# Patient Record
(demographics unavailable — no encounter records)

---

## 2024-11-06 NOTE — REVIEW OF SYSTEMS
[Postnasal Drip] : postnasal drip [SOB on Exertion] : sob on exertion [Fever] : no fever [Recent Wt Loss (___ Lbs)] : ~T no recent weight loss [Dry Eyes] : no dry eyes [Mouth Ulcers] : no mouth ulcers [Cough] : no cough [Wheezing] : no wheezing [Chest Discomfort] : no chest discomfort [GERD] : no gerd

## 2024-11-06 NOTE — PHYSICAL EXAM
[No Acute Distress] : no acute distress [Normal Oropharynx] : normal oropharynx [I] : Mallampati Class: I [Normal Appearance] : normal appearance [No Neck Mass] : no neck mass [Normal Rate/Rhythm] : normal rate/rhythm [Normal S1, S2] : normal s1, s2 [No Murmurs] : no murmurs [No Resp Distress] : no resp distress [Wheeze] : wheeze [Kyphosis] : kyphosis [Not Tender] : not tender [Soft] : soft [No HSM] : no hsm [Normal Bowel Sounds] : normal bowel sounds [No Clubbing] : no clubbing [No Edema] : no edema [No Focal Deficits] : no focal deficits [Oriented x3] : oriented x3 [Normal Affect] : normal affect [TextBox_68] : moderate diffuse  rhonchi and  squeaks, crackles

## 2024-11-06 NOTE — HISTORY OF PRESENT ILLNESS
[Never] : never [TextBox_4] : 82 year old woman returns for follow up. She has bronchiectasis and interstitial lung disease. It has not progressed.   PFT has demonstrated moderate restriction. CT chest was performed February 2019 and demonstrated interstitial infiltrates and bronchial inflammation. Possibility of mild IPF was raised. However, bronchiectasis and mosaic pattern suggest possible chronic hypersensitivity pneumonitis.   She was born in [place]. [place] has [place] in [place] age 17. She has had no animal exposure, no pets. She has no dampness or mold in her apartment. She has no arthritis, rash. She has no exposure to dirty conditions, dirty air conditioner. No exposure to humidifier or to down pillows.    She initially presented and was hospitalized at [facility] in December 2018 and was found to have RSV infection.  CT chest at the time revealed interstitial infiltrates and bronchial inflammation. She had significant cough and dyspnea that improved.  She required subsequent treatment with oral antibiotic and steroid taper due to cough and chest congestion.   She had normal EST.      She denies dyspnea and  states that she walks without limitation  PFT has shown moderate restriction   She has residual cough with white sputum.  She did not have fever.  She was wheezing.   She denies dyspnea.

## 2024-11-06 NOTE — DISCUSSION/SUMMARY
[FreeTextEntry1] : 82 year old woman with bronchiectasis/ILD   She initially presented and was hospitalized at [facility] in December 2018 and was found to have RSV infection.  CT chest at the time revealed interstitial infiltrates and bronchial inflammation. She had significant cough and dyspnea that improved.  She required subsequent treatment with oral antibiotic and steroid taper due to cough and chest congestion.  She has had episodes , likely  exacerbation of bronchiectasis She has been treated azithromycin with benefit  Breath sounds demonstrated prominent squeaks and rhonchi  She had NC CT chest 11/8/22  that demonstrated interstitial lung disease with tree in bud and air trapping monitor sputum production CT chest at  [facility] 04/20/2024 demonstrated interstitial fibrosis and traction bronchiectasis reportedly similar to CT of 2022 (but worse compared to CT from  [facility] from 2019 and 2021)  There is also mosaic pattern consistent with air trapping   She emphasizes that she feels well  PFT with restriction  Not using  inhaled bronchodilator   I am concerned about  worsening lung scarring   PLAN monitor O2 saturation  check CTD serologies and hypersensitivity panel to evaluate for chronic  hypersensitivity pneumonitis  Obtain CD of images from  [facility] to have radiologist review in comparison to current CT  I will consider starting an antifibrotic for PFF Will repeat CT chest for follow up   as needed    Total time spent : 40 minutes Including: Preparation prior to visit - Reviewing prior record, results of tests and Consultation Reports as applicable Conducting an appropriate H & P during today's encounter Appropriate orders for tests, medications and procedures, as applicable Counseling patient  Note completion   Issac Vicente MD

## 2025-01-04 NOTE — DISCUSSION/SUMMARY
[FreeTextEntry1] : 83 year old woman with bronchiectasis/ILD   She initially presented and was hospitalized at [facility] in December 2018 and was found to have RSV infection.  CT chest at the time revealed interstitial infiltrates and bronchial inflammation. She had significant cough and dyspnea that improved.  She required subsequent treatment with oral antibiotic and steroid taper due to cough and chest congestion.  She has had episodes , likely  exacerbation of bronchiectasis She has been treated azithromycin with benefit  Breath sounds demonstrated prominent squeaks and rhonchi  She had NC CT chest 11/8/22  that demonstrated interstitial lung disease with tree in bud and air trapping monitor sputum production CT chest at  [facility] 04/20/2024 demonstrated interstitial fibrosis and traction bronchiectasis reportedly similar to CT of 2022 (but worse compared to CT from  [facility] from 2019 and 2021)  There is also mosaic pattern consistent with air trapping   She emphasizes that she feels well  PFT with restriction  Not using  inhaled bronchodilator   I am concerned about  worsening lung scarring   PLAN monitor O2 saturation  check CTD serologies and hypersensitivity panel to evaluate for chronic  hypersensitivity pneumonitis  Obtain CD of images from  [facility] to have radiologist review in comparison to current CT  I will consider starting an antifibrotic for PFF Will repeat CT chest for follow up   as needed    Total time spent : 40 minutes Including: Preparation prior to visit - Reviewing prior record, results of tests and Consultation Reports as applicable Conducting an appropriate H & P during today's encounter Appropriate orders for tests, medications and procedures, as applicable Counseling patient  Note completion   Issac Vicente MD

## 2025-01-04 NOTE — HISTORY OF PRESENT ILLNESS
[Never] : never [TextBox_4] : 82 year old woman returns for follow up. She has bronchiectasis and interstitial lung disease. It has not progressed.   PFT has demonstrated moderate restriction. CT chest was performed February 2019 and demonstrated interstitial infiltrates and bronchial inflammation. Possibility of mild IPF was raised. However, bronchiectasis and mosaic pattern suggest possible chronic hypersensitivity pneumonitis.   She was born in [place]. [place] has [place] in [place] age 17. She has had no animal exposure, no pets. She has no dampness or mold in her apartment. She has no arthritis, rash. She has no exposure to dirty conditions, dirty air conditioner. No exposure to humidifier or to down pillows.    She initially presented and was hospitalized at [facility] in December 2018 and was found to have RSV infection.  CT chest at the time revealed interstitial infiltrates and bronchial inflammation. She had significant cough and dyspnea that improved.  She required subsequent treatment with oral antibiotic and steroid taper due to cough and chest congestion.   She had normal EST.      She denies dyspnea and  states that she walks without limitation  PFT has shown moderate restriction   She has residual cough with white sputum.  She did not have fever.  She was wheezing.   She denies dyspnea.

## 2025-01-04 NOTE — REVIEW OF SYSTEMS
[Fever] : no fever [Recent Wt Loss (___ Lbs)] : ~T no recent weight loss [Dry Eyes] : no dry eyes [Postnasal Drip] : postnasal drip [Mouth Ulcers] : no mouth ulcers [Cough] : no cough [Wheezing] : no wheezing [SOB on Exertion] : sob on exertion [Chest Discomfort] : no chest discomfort [GERD] : no gerd

## 2025-01-15 NOTE — REVIEW OF SYSTEMS
[Fever] : no fever [Recent Wt Loss (___ Lbs)] : ~T no recent weight loss [Dry Eyes] : no dry eyes [Mouth Ulcers] : no mouth ulcers [Cough] : no cough [Wheezing] : no wheezing [Chest Discomfort] : no chest discomfort [GERD] : no gerd

## 2025-01-15 NOTE — HISTORY OF PRESENT ILLNESS
[TextBox_4] : 82 year old woman returns for follow up. She has bronchiectasis and interstitial lung disease. It has not progressed.   PFT has demonstrated moderate restriction. CT chest was performed February 2019 and demonstrated interstitial infiltrates and bronchial inflammation. Possibility of mild IPF was raised. However, bronchiectasis and mosaic pattern suggest possible chronic hypersensitivity pneumonitis.   She was born in [place]. [place] has lived in [place] age 17. She has had no animal exposure, no pets. She has no dampness or mold in her apartment. She has no arthritis, rash. She has no exposure to dirty conditions, dirty air conditioner. No exposure to humidifier or to down pillows.    She initially presented and was hospitalized at [facility] in December 2018 and was found to have RSV infection.  CT chest at the time revealed interstitial infiltrates and bronchial inflammation. She had significant cough and dyspnea that improved.  She required subsequent treatment with oral antibiotic and steroid taper due to cough and chest congestion.   She had normal EST.      She denies dyspnea and  states that she walks without limitation  PFT has shown moderate restriction   She has residual cough with white sputum.  She did not have fever.  She was wheezing.   She denies dyspnea.

## 2025-01-15 NOTE — PROCEDURE
[FreeTextEntry1] : Spirometry consistent with restriction diminished   DLCO  stable  Issac Vicente MD

## 2025-01-15 NOTE — HISTORY OF PRESENT ILLNESS
[TextBox_4] : 82 year old woman returns for follow up. She has bronchiectasis and interstitial lung disease. It has not progressed.   PFT has demonstrated moderate restriction. CT chest was performed February 2019 and demonstrated interstitial infiltrates and bronchial inflammation. Possibility of mild IPF was raised. However, bronchiectasis and mosaic pattern suggest possible chronic hypersensitivity pneumonitis.   She was born in [place]. She has lived [place] age 17. She has had no animal exposure, no pets. She has no dampness or mold in her apartment. She has no arthritis, rash. She has no exposure to dirty conditions, dirty air conditioner. No exposure to humidifier or to down pillows.    She initially presented and was hospitalized at [facility] in December 2018 and was found to have RSV infection.  CT chest at the time revealed interstitial infiltrates and bronchial inflammation. She had significant cough and dyspnea that improved.  She required subsequent treatment with oral antibiotic and steroid taper due to cough and chest congestion.   She had normal EST.      She denies dyspnea and  states that she walks without limitation  PFT has shown moderate restriction   She has residual cough with white sputum.  She did not have fever.  She was wheezing.   She denies dyspnea.

## 2025-01-15 NOTE — DISCUSSION/SUMMARY
[FreeTextEntry1] : 83 year old woman with bronchiectasis/ILD   She initially presented and was hospitalized at [facility] in December 2018 and was found to have RSV infection.  CT chest at the time revealed interstitial infiltrates and bronchial inflammation. She had significant cough and dyspnea that improved.  She required subsequent treatment with oral antibiotic and steroid taper due to cough and chest congestion.  She has had episodes , likely  exacerbation of bronchiectasis She has been treated azithromycin with benefit  Breath sounds demonstrated prominent squeaks and rhonchi  She had NC CT chest 11/8/22  that demonstrated interstitial lung disease with tree in bud and air trapping monitor sputum production CT chest at  [facility] 04/20/2024 demonstrated interstitial fibrosis and traction bronchiectasis reportedly similar to CT of 2022 (but worse compared to CT from  [facility] from 2019 and 2021)  There is also mosaic pattern consistent with air trapping   She emphasizes that she feels well  PFT with restriction  Not using  inhaled bronchodilator   I am concerned about  worsening lung scarring   checked CTD serologies and hypersensitivity panel to evaluate for chronic  hypersensitivity pneumonitis ANCA indeterminate  ds DNA and JOSH positive  PLAN  repeat NC HRCT Chest  discussed antifibrotic, provided with literature  will review all CT chest and decide whether antifibrotic indicated for possible PFILD  monitor O2 saturation  Total time spent : 30 minutes Including: Preparation prior to visit - Reviewing prior record, results of tests and Consultation Reports as applicable Conducting an appropriate H & P during today's encounter Appropriate orders for tests, medications and procedures, as applicable Counseling patient  Note completion   Issac Vicente MD

## 2025-01-15 NOTE — DISCUSSION/SUMMARY
[FreeTextEntry1] : 83 year old woman with bronchiectasis/ILD   She initially presented and was hospitalized at [facility] in December 2018 and was found to have RSV infection.  CT chest at the time revealed interstitial infiltrates and bronchial inflammation. She had significant cough and dyspnea that improved.  She required subsequent treatment with oral antibiotic and steroid taper due to cough and chest congestion.  She has had episodes , likely  exacerbation of bronchiectasis She has been treated azithromycin with benefit  Breath sounds demonstrated prominent squeaks and rhonchi  She had NC CT chest 11/8/22  that demonstrated interstitial lung disease with tree in bud and air trapping monitor sputum production CT chest at  [facility] 04/20/2024 demonstrated interstitial fibrosis and traction bronchiectasis reportedly similar to CT of 2022 (but worse compared to CT from  [facility] from 2019 and 2021)  There is also mosaic pattern consistent with air trapping   She emphasizes that she feels well  PFT with restriction  Not using  inhaled bronchodilator   I am concerned about  worsening lung scarring   checked CTD serologies and hypersensitivity panel to evaluate for chronic  hypersensitivity pneumonitis ANCA indeterminate  ds DNA and JOSH positive  PLAN  repeat NC HRCT Chest  discussed antifibrotic, provided with literature  will review all CT chest and decide whether antifibrotic indicated for possible PFILD  monitor O2 saturation  Total time spent : 30 minutes Including: Preparation prior to visit - Reviewing prior record, results of tests and Consultation Reports as applicable Conducting an appropriate H & P during today's encounter Appropriate orders for tests, medications and procedures, as applicable Counseling patient  Note completion   Issac Vicente MD

## 2025-04-29 NOTE — DISCUSSION/SUMMARY
[FreeTextEntry1] : 83 year old woman with bronchiectasis/ILD   She initially presented and was hospitalized at [facility] in December 2018 and was found to have RSV infection.  CT chest at the time revealed interstitial infiltrates and bronchial inflammation. She had significant cough and dyspnea that improved.  She required subsequent treatment with oral antibiotic and steroid taper due to cough and chest congestion.  She has had episodes , likely  exacerbation of bronchiectasis She has been treated azithromycin with benefit  Breath sounds demonstrated prominent squeaks and rhonchi  She had NC CT chest 11/8/22  that demonstrated interstitial lung disease with tree in bud and air trapping monitor sputum production CT chest at  [facility] 04/20/2024 demonstrated interstitial fibrosis and traction bronchiectasis reportedly similar to CT of 2022 (but worse compared to CT from  [facility] from 2019 and 2021)  There is also mosaic pattern consistent with air trapping   She emphasizes that she feels well  PFT with restriction  Not using  inhaled bronchodilator   I am concerned about  worsening lung scarring   checked CTD serologies and hypersensitivity panel to evaluate for chronic  hypersensitivity pneumonitis ANCA indeterminate  ds DNA and JOSH positive  repeat NC HRCT Chest   2/11/25 demonstrated mild  worsening consistent with 2021 Using mullein  PLAN  discussed antifibrotic, provided with literature.  She discussed with her son and so far has declined this  will monitor respiratory status  Discuss further with her son monitor O2 saturation  Total time spent : 30 minutes Including: Preparation prior to visit - Reviewing prior record, results of tests and Consultation Reports as applicable Conducting an appropriate H & P during today's encounter Appropriate orders for tests, medications and procedures, as applicable Counseling patient  Note completion   Issac Vicente MD

## 2025-04-29 NOTE — PROCEDURE
[FreeTextEntry1] : PRO CROSS Date of Birth 1941-11-18 Procedure CT CHEST w/o contrast Study Date & Time 2025-01-22 1:58 PM Patient ID [id number] Accession Number [id number] Referring Physician IKER BROWNE Institution Name [id number] RADIOLOGY REPORT FINDINGS FINDING ADDENDUM This addendum is dictated on 2/11/2025 in order to compare the 1/22/2025 examination with an additional remote previous outside chest CT from 1/20/2025 which has now been made available. The changes of moderate interstitial lung disease which are described on the current examination, with associated air trapping, have demonstrated mild diffuse interval worsening relative to the previous examination from 2021. Electronically signed by: Jose Angel Coleman MD 2/11/2025 7:17 PM Workstation: [id number] Addendum Electronically Signed By: Jose Angel Coleman MD Sign Date: 11-FEB-25 Reason for examination : Interstitial lung disease, follow-up Comparison: 4/20/2024 Technique: 3 mm spiral sections were obtained through the thorax both during inspiration and expiration.. This CT examination was performed using one or more of the following dose reduction techniques: Automated exposure control, adjustment of the mA and/or kV according to patient size, or use of a iterative reconstruction technique. Findings: HILUM AND MEDIASTINUM : No significant adenopathy or masses are present. THORACIC AORTA: No evidence of aneurysm. LUNG PARENCHYMA : There is a moderate degree of diffuse interstitial lung disease again identified, manifested by areas of reticulation, mosaic attenuation with air trapping, moderate traction bronchiectasis, and areas of groundglass opacity.. The findings have overall worsened mildly. In 4/29/25, 11:53 AM [employer] [email or web address] 1/2 addition, there is worsening superimposed consolidative opacity now identified in the left upper lobe, predominantly involving the anterior segment, which may represent superimposed infection versus worsening pneumonitis. There is no evidence of posterior subpleural sparing. There is no evidence of associated honeycombing. There are probable associated areas of small airways inflammatory disease also noted bilaterally. PLEURA : No evidence of pleural effusion or pleural-based mass. LOWER NECK/THYROID : Without focal nodules or masses. OSSEOUS STRUCTURES : Without focal mass. UPPER ABDOMEN : Small hiatal hernia. OTHER : Negative. IMPRESSION: Moderate degree of interstitial lung disease with associated air trapping, slightly worsened. These findings are most suggestive of chronic hypersensitivity pneumonitis. Additional less likely possibilities would include nonspecific interstitial pneumonia. Associated worsening predominantly consolidative airspace opacity in the left upper lobe which may be due to superimposed infection versus worsening left upper lobe pneumonitis. Electronically signed by: Jose Angel Coleman MD 1/22/2025 2:13 PM Workstation: [id number] Electronically Signed By: Jared URIOSTEGUI, Joes Angel BARAHONA Sign Date: 22-JAN-25 [facility] MD fco

## 2025-04-29 NOTE — HISTORY OF PRESENT ILLNESS
[TextBox_4] : 83 year old woman returns for follow up. She has bronchiectasis and interstitial lung disease. It has not progressed.   PFT has demonstrated moderate restriction. CT chest was performed February 2019 and demonstrated interstitial infiltrates and bronchial inflammation. Possibility of mild IPF was raised. However, bronchiectasis and mosaic pattern suggest possible chronic hypersensitivity pneumonitis.   She was born in [place]. [place] has [place] 17. She has had no animal exposure, no pets. She has no dampness or mold in her apartment. She has no arthritis, rash. She has no exposure to dirty conditions, dirty air conditioner. No exposure to humidifier or to down pillows.    She initially presented and was hospitalized at [facility] in December 2018 and was found to have RSV infection.  CT chest at the time revealed interstitial infiltrates and bronchial inflammation. She had significant cough and dyspnea that improved.  She required subsequent treatment with oral antibiotic and steroid taper due to cough and chest congestion.   She had normal EST.      She denies dyspnea and  states that she walks without limitation  PFT has shown moderate restriction   She has residual cough with white sputum.  She did not have fever.  She was wheezing.   She denies dyspnea.

## 2025-04-29 NOTE — PROCEDURE
[FreeTextEntry1] : PRO CROSS Date of Birth 1941-11-[address] CHEST w/o contrast Study Date & Time 2025-01-22 1:58 PM Patient ID [id number] Accession Number [id number] Referring Physician IKER BROWNE Institution Name [email or web address] RADIOLOGY REPORT FINDINGS FINDING ADDENDUM This addendum is dictated on 2/11/2025 in order to compare the 1/22/2025 examination with an additional remote previous outside chest CT from 1/20/2025 which has now been made available. The changes of moderate interstitial lung disease which are described on the current examination, with associated air trapping, have demonstrated mild diffuse interval worsening relative to the previous examination from 2021. Electronically signed by: Jose Angel Coleman MD 2/11/2025 7:17 PM Workstation: [id number] Addendum Electronically Signed By: Jose Angel Coleman MD Sign Date: 11-FEB-25 Reason for examination : Interstitial lung disease, follow-up Comparison: 4/20/2024 Technique: 3 mm spiral sections were obtained through the thorax both during inspiration and expiration.. This CT examination was performed using one or more of the following dose reduction techniques: Automated exposure control, adjustment of the mA and/or kV according to patient size, or use of a iterative reconstruction technique. Findings: HILUM AND MEDIASTINUM : No significant adenopathy or masses are present. THORACIC AORTA: No evidence of aneurysm. LUNG PARENCHYMA : There is a moderate degree of diffuse interstitial lung disease again identified, manifested by areas of reticulation, mosaic attenuation with air trapping, moderate traction bronchiectasis, and areas of groundglass opacity.. The findings have overall worsened mildly. In 4/29/25, 11:53 AM Syn[employer] [email or web address] 1/2 addition, there is worsening superimposed consolidative opacity now identified in the left upper lobe, predominantly involving the anterior segment, which may represent superimposed infection versus worsening pneumonitis. There is no evidence of posterior subpleural sparing. There is no evidence of associated honeycombing. There are probable associated areas of small airways inflammatory disease also noted bilaterally. PLEURA : No evidence of pleural effusion or pleural-based mass. LOWER NECK/THYROID : Without focal nodules or masses. OSSEOUS STRUCTURES : Without focal mass. UPPER ABDOMEN : Small hiatal hernia. OTHER : Negative. IMPRESSION: Moderate degree of interstitial lung disease with associated air trapping, slightly worsened. These findings are most suggestive of chronic hypersensitivity pneumonitis. Additional less likely possibilities would include nonspecific interstitial pneumonia. Associated worsening predominantly consolidative airspace opacity in the left upper lobe which may be due to superimposed infection versus worsening left upper lobe pneumonitis. Electronically signed by: Jose Angel Coleman MD 1/22/2025 2:13 PM Workstation: [id number] Electronically Signed By: Jared URIOSTEGUI, Jose Angel BARAHONA Sign Date: 22-JAN-25 [facility] Radiology MD fco

## 2025-04-29 NOTE — HISTORY OF PRESENT ILLNESS
[TextBox_4] : 83 year old woman returns for follow up. She has bronchiectasis and interstitial lung disease. It has not progressed.   PFT has demonstrated moderate restriction. CT chest was performed February 2019 and demonstrated interstitial infiltrates and bronchial inflammation. Possibility of mild IPF was raised. However, bronchiectasis and mosaic pattern suggest possible chronic hypersensitivity pneumonitis.   She was born in [place]. She has lived in [place] age 17. She has had no animal exposure, no pets. She has no dampness or mold in her apartment. She has no arthritis, rash. She has no exposure to dirty conditions, dirty air conditioner. No exposure to humidifier or to down pillows.    She initially presented and was hospitalized at [facility] in December 2018 and was found to have RSV infection.  CT chest at the time revealed interstitial infiltrates and bronchial inflammation. She had significant cough and dyspnea that improved.  She required subsequent treatment with oral antibiotic and steroid taper due to cough and chest congestion.   She had normal EST.      She denies dyspnea and  states that she walks without limitation  PFT has shown moderate restriction   She has residual cough with white sputum.  She did not have fever.  She was wheezing.   She denies dyspnea.

## 2025-04-29 NOTE — HISTORY OF PRESENT ILLNESS
[TextBox_4] : 83 year old woman returns for follow up. She has bronchiectasis and interstitial lung disease. It has not progressed.   PFT has demonstrated moderate restriction. CT chest was performed February 2019 and demonstrated interstitial infiltrates and bronchial inflammation. Possibility of mild IPF was raised. However, bronchiectasis and mosaic pattern suggest possible chronic hypersensitivity pneumonitis.   She was born in [place]. [place] has lived in [place] age 17. She has had no animal exposure, no pets. She has no dampness or mold in her apartment. She has no arthritis, rash. She has no exposure to dirty conditions, dirty air conditioner. No exposure to humidifier or to down pillows.    She initially presented and was hospitalized at [facility] in December 2018 and was found to have RSV infection.  CT chest at the time revealed interstitial infiltrates and bronchial inflammation. She had significant cough and dyspnea that improved.  She required subsequent treatment with oral antibiotic and steroid taper due to cough and chest congestion.   She had normal EST.      She denies dyspnea and  states that she walks without limitation  PFT has shown moderate restriction   She has residual cough with white sputum.  She did not have fever.  She was wheezing.   She denies dyspnea.

## 2025-04-29 NOTE — PROCEDURE
[FreeTextEntry1] : PRO CROSS Date of Birth 1941-11-18 Procedure CT CHEST w/o contrast Study Date & Time 2025-01-22 1:58 PM Patient ID [id number] Accession Number [id number] Referring Physician IKER BROWNE Institution Name [id number] RADIOLOGY REPORT FINDINGS FINDING ADDENDUM This addendum is dictated on 2/11/2025 in order to compare the 1/22/2025 examination with an additional remote previous outside chest CT from 1/20/2025 which has now been made available. The changes of moderate interstitial lung disease which are described on the current examination, with associated air trapping, have demonstrated mild diffuse interval worsening relative to the previous examination from 2021. Electronically signed by: Jose Angel Coleman MD 2/11/2025 7:17 PM Workstation: [id number] Addendum Electronically Signed By: Jose Angel Coleman MD Sign Date: 11-FEB-25 Reason for examination : Interstitial lung disease, follow-up Comparison: 4/20/2024 Technique: 3 mm spiral sections were obtained through the thorax both during inspiration and expiration.. This CT examination was performed using one or more of the following dose reduction techniques: Automated exposure control, adjustment of the mA and/or kV according to patient size, or use of a iterative reconstruction technique. Findings: HILUM AND MEDIASTINUM : No significant adenopathy or masses are present. THORACIC AORTA: No evidence of aneurysm. LUNG PARENCHYMA : There is a moderate degree of diffuse interstitial lung disease again identified, manifested by areas of reticulation, mosaic attenuation with air trapping, moderate traction bronchiectasis, and areas of groundglass opacity.. The findings have overall worsened mildly. In 4/29/25, 11:53 AM Syn[employer] Mobility [email or web address] 1/2 addition, there is worsening superimposed consolidative opacity now identified in the left upper lobe, predominantly involving the anterior segment, which may represent superimposed infection versus worsening pneumonitis. There is no evidence of posterior subpleural sparing. There is no evidence of associated honeycombing. There are probable associated areas of small airways inflammatory disease also noted bilaterally. PLEURA : No evidence of pleural effusion or pleural-based mass. LOWER NECK/THYROID : Without focal nodules or masses. OSSEOUS STRUCTURES : Without focal mass. UPPER ABDOMEN : Small hiatal hernia. OTHER : Negative. IMPRESSION: Moderate degree of interstitial lung disease with associated air trapping, slightly worsened. These findings are most suggestive of chronic hypersensitivity pneumonitis. Additional less likely possibilities would include nonspecific interstitial pneumonia. Associated worsening predominantly consolidative airspace opacity in the left upper lobe which may be due to superimposed infection versus worsening left upper lobe pneumonitis. Electronically signed by: Jose Angel Coleman MD 1/22/2025 2:13 PM Workstation: [id number] Electronically Signed By: Jared URIOSTEGUI, Jose Angel BARAHONA Sign Date: 22-JAN-25 [facility] Radiology MD fco

## 2025-04-29 NOTE — DISCUSSION/SUMMARY
[FreeTextEntry1] : 83 year old woman with bronchiectasis/ILD   She initially presented and was hospitalized at [facility] in December 2018 and was found to have RSV infection.  CT chest at the time revealed interstitial infiltrates and bronchial inflammation. She had significant cough and dyspnea that improved.  She required subsequent treatment with oral antibiotic and steroid taper due to cough and chest congestion.  She has had episodes , likely  exacerbation of bronchiectasis She has been treated azithromycin with benefit  Breath sounds demonstrated prominent squeaks and rhonchi  She had NC CT chest 11/8/22  that demonstrated interstitial lung disease with tree in bud and air trapping monitor sputum production CT chest at  [facility] 04/20/2024 demonstrated interstitial fibrosis and traction bronchiectasis reportedly similar to CT of 2022 (but worse compared to CT from  [facility] from 2019 and 2021)  There is also mosaic pattern consistent with air trapping   She emphasizes that she feels well  PFT with restriction  Not using  inhaled bronchodilator   I am concerned about  worsening lung scarring   checked CTD serologies and hypersensitivity panel to evaluate for chronic  hypersensitivity pneumonitis ANCA indeterminate  ds DNA and JOSH positive  repeat NC HRCT Chest   2/11/25 demonstrated mild  worsening consistent with 2021 Using mullein  PLAN  discussed antifibrotic, provided with literature.  She discussed with her son and so far has declined this  will monitor respiratory status  Discuss further with her son monitor O2 saturation  Total time spent : 30 minutes Including: Preparation prior to visit - Reviewing prior record, results of tests and Consultation Reports as applicable Conducting an appropriate H & P during today's encounter Appropriate orders for tests, medications and procedures, as applicable Counseling patient  Note completion   Issac Vicente MD

## 2025-04-29 NOTE — DISCUSSION/SUMMARY
[FreeTextEntry1] : 83 year old woman with bronchiectasis/ILD   She initially presented and was hospitalized at [facility] in December 2018 and was found to have RSV infection.  CT chest at the time revealed interstitial infiltrates and bronchial inflammation. She had significant cough and dyspnea that improved.  She required subsequent treatment with oral antibiotic and steroid taper due to cough and chest congestion.  She has had episodes , likely  exacerbation of bronchiectasis She has been treated azithromycin with benefit  Breath sounds demonstrated prominent squeaks and rhonchi  She had NC CT chest 11/8/22  that demonstrated interstitial lung disease with tree in bud and air trapping monitor sputum production CT chest at  [facility] 04/20/2024 demonstrated interstitial fibrosis and traction bronchiectasis reportedly similar to CT of 2022 (but worse compared to CT from  [facility] from 2019 and 2021)  There is also mosaic pattern consistent with air trapping   She emphasizes that she feels well  PFT with restriction  Not using  inhaled bronchodilator   I am concerned about  worsening lung scarring   checked CTD serologies and hypersensitivity panel to evaluate for chronic  hypersensitivity pneumonitis ANCA indeterminate  ds DNA and JOSH positive  repeat NC HRCT Chest   2/11/25 demonstrated mild  worsening consistent with 2021 Using mullein  PLAN  discussed antifibrotic, provided with literature.  She discussed with her son and so far has declined this  will monitor respiratory status  Discuss further with her son monitor O2 saturation  Total time spent : 30 minutes Including: Preparation prior to visit - Reviewing prior record, results of tests and Consultation Reports as applicable Conducting an appropriate H & P during today's encounter Appropriate orders for tests, medications and procedures, as applicable Counseling patient  Note completion   Issac Vicente MD

## 2025-06-20 NOTE — PROCEDURE
[FreeTextEntry1] : PRO CROSS Date of Birth 1941-11-18 Procedure CT CHEST w/o contrast Study Date & Time 2025-01-22 1:58 PM Patient ID [id number] Accession Number [id number] Referring Physician IKER BROWNE Institution Name [id number] Report RADIOLOGY REPORT FINDINGS FINDING ADDENDUM This addendum is dictated on 2/11/2025 in order to compare the 1/22/2025 examination with an additional remote previous outside chest CT from 1/20/2025 which has now been made available. The changes of moderate interstitial lung disease which are described on the current examination, with associated air trapping, have demonstrated mild diffuse interval worsening relative to the previous examination from 2021. Electronically signed by: Jose Angel Coleman MD 2/11/2025 7:17 PM Workstation: [id number] Addendum Electronically Signed By: Jose Angel Coleman MD Sign Date: 11-FEB-25 Reason for examination : Interstitial lung disease, follow-up Comparison: 4/20/2024 Technique: 3 mm spiral sections were obtained through the thorax both during inspiration and expiration.. This CT examination was performed using one or more of the following dose reduction techniques: Automated exposure control, adjustment of the mA and/or kV according to patient size, or use of a iterative reconstruction technique. Findings: HILUM AND MEDIASTINUM : No significant adenopathy or masses are present. THORACIC AORTA: No evidence of aneurysm. LUNG PARENCHYMA : There is a moderate degree of diffuse interstitial lung disease again identified, manifested by areas of reticulation, mosaic attenuation with air trapping, moderate traction bronchiectasis, and areas of groundglass opacity.. The findings have overall worsened mildly. In 4/29/25, 11:53 AM Syn[employer] Mob[employer] [email or web address] 1/2 addition, there is worsening superimposed consolidative opacity now identified in the left upper lobe, predominantly involving the anterior segment, which may represent superimposed infection versus worsening pneumonitis. There is no evidence of posterior subpleural sparing. There is no evidence of associated honeycombing. There are probable associated areas of small airways inflammatory disease also noted bilaterally. PLEURA : No evidence of pleural effusion or pleural-based mass. LOWER NECK/THYROID : Without focal nodules or masses. OSSEOUS STRUCTURES : Without focal mass. UPPER ABDOMEN : Small hiatal hernia. OTHER : Negative. IMPRESSION: Moderate degree of interstitial lung disease with associated air trapping, slightly worsened. These findings are most suggestive of chronic hypersensitivity pneumonitis. Additional less likely possibilities would include nonspecific interstitial pneumonia. Associated worsening predominantly consolidative airspace opacity in the left upper lobe which may be due to superimposed infection versus worsening left upper lobe pneumonitis. Electronically signed by: Jose Angel Coleman MD 1/22/2025 2:13 PM Workstation: [id number] Electronically Signed By: Jared URIOSTEGUI, Jose Angel BARAHONA Sign Date: 22-JAN-25 [facility] Radiology MD fco

## 2025-06-20 NOTE — DISCUSSION/SUMMARY
[FreeTextEntry1] : 83 year old woman with bronchiectasis/ILD   She initially presented and was hospitalized at [facility] in December 2018 and was found to have RSV infection.  CT chest at the time revealed interstitial infiltrates and bronchial inflammation. She had significant cough and dyspnea that improved.  She required subsequent treatment with oral antibiotic and steroid taper due to cough and chest congestion.  She has had episodes , likely  exacerbation of bronchiectasis She has been treated azithromycin with benefit  Breath sounds demonstrated prominent squeaks and rhonchi  She had NC CT chest 11/8/22  that demonstrated interstitial lung disease with tree in bud and air trapping monitor sputum production CT chest at  [facility] radiology 04/20/2024 demonstrated interstitial fibrosis and traction bronchiectasis reportedly similar to CT of 2022 (but worse compared to CT from  [facility] Radiology from 2019 and 2021)  There is also mosaic pattern consistent with air trapping   She emphasizes that she feels well  PFT with restriction  Not using  inhaled bronchodilator   I am concerned about  worsening lung scarring  checked CTD serologies and hypersensitivity panel to evaluate for chronic  hypersensitivity pneumonitis ANCA indeterminate  ds DNA and JOSH positive  repeat NC HRCT Chest   2/11/25 demonstrated mild  worsening consistent with 2021 Using mullein  had some increased dyspnea, returns for follow up O2 sat 92%  uses Breztri  took guaifenesin, feels better  PLAN  discussed antifibrotic, provided with literature.  She discussed with her son and so far has declined this  will monitor respiratory status  Discussed further with her son monitor O2 saturation  plan to follow CT chest  The order has been placed.  Pt is agreeable. Total time spent : 30 minutes Including: Preparation prior to visit - Reviewing prior record, results of tests and Consultation Reports as applicable Conducting an appropriate H & P during today's encounter Appropriate orders for tests, medications and procedures, as applicable Counseling patient  Note completion   Issac Vicente MD

## 2025-06-20 NOTE — HISTORY OF PRESENT ILLNESS
[TextBox_4] : 83 year old woman returns for follow up. She has bronchiectasis and interstitial lung disease. It has not progressed.   PFT has demonstrated moderate restriction. CT chest was performed February 2019 and demonstrated interstitial infiltrates and bronchial inflammation. Possibility of mild IPF was raised. However, bronchiectasis and mosaic pattern suggest possible chronic hypersensitivity pneumonitis.   She was born in [place]. [place] has [place] in [place] 17. She has had no animal exposure, no pets. She has no dampness or mold in her apartment. She has no arthritis, rash. She has no exposure to dirty conditions, dirty air conditioner. No exposure to humidifier or to down pillows.    She initially presented and was hospitalized at [facility] in December 2018 and was found to have RSV infection.  CT chest at the time revealed interstitial infiltrates and bronchial inflammation. She had significant cough and dyspnea that improved.  She required subsequent treatment with oral antibiotic and steroid taper due to cough and chest congestion.   She had normal EST.      She denies dyspnea and  states that she walks without limitation  PFT has shown moderate restriction   She has residual cough with white sputum.  She did not have fever.  She was wheezing.   She denies dyspnea.

## 2025-06-20 NOTE — HISTORY OF PRESENT ILLNESS
[TextBox_4] : 83 year old woman returns for follow up. She has bronchiectasis and interstitial lung disease. It has not progressed.   PFT has demonstrated moderate restriction. CT chest was performed February 2019 and demonstrated interstitial infiltrates and bronchial inflammation. Possibility of mild IPF was raised. However, bronchiectasis and mosaic pattern suggest possible chronic hypersensitivity pneumonitis.   She was born in [place]. [place] has lived in [place] 17. She has had no animal exposure, no pets. She has no dampness or mold in her apartment. She has no arthritis, rash. She has no exposure to dirty conditions, dirty air conditioner. No exposure to humidifier or to down pillows.    She initially presented and was hospitalized at [facility] in December 2018 and was found to have RSV infection.  CT chest at the time revealed interstitial infiltrates and bronchial inflammation. She had significant cough and dyspnea that improved.  She required subsequent treatment with oral antibiotic and steroid taper due to cough and chest congestion.   She had normal EST.      She denies dyspnea and  states that she walks without limitation  PFT has shown moderate restriction   She has residual cough with white sputum.  She did not have fever.  She was wheezing.   She denies dyspnea.

## 2025-06-20 NOTE — DISCUSSION/SUMMARY
[FreeTextEntry1] : 83 year old woman with bronchiectasis/ILD   She initially presented and was hospitalized at [facility] in December 2018 and was found to have RSV infection.  CT chest at the time revealed interstitial infiltrates and bronchial inflammation. She had significant cough and dyspnea that improved.  She required subsequent treatment with oral antibiotic and steroid taper due to cough and chest congestion.  She has had episodes , likely  exacerbation of bronchiectasis She has been treated azithromycin with benefit  Breath sounds demonstrated prominent squeaks and rhonchi  She had NC CT chest 11/8/22  that demonstrated interstitial lung disease with tree in bud and air trapping monitor sputum production CT chest at  [facility] radiology 04/20/2024 demonstrated interstitial fibrosis and traction bronchiectasis reportedly similar to CT of 2022 (but worse compared to CT from  [facility] Radiology from 2019 and 2021)  There is also mosaic pattern consistent with air trapping   She emphasizes that she feels well  PFT with restriction  Not using  inhaled bronchodilator   I am concerned about  worsening lung scarring  checked CTD serologies and hypersensitivity panel to evaluate for chronic  hypersensitivity pneumonitis ANCA indeterminate  ds DNA and JOSH positive  repeat NC HRCT Chest   2/11/25 demonstrated mild  worsening consistent with 2021 Using mullein  had some increased dyspnea, returns for follow up O2 sat 92%  uses Breztri  took guaifenesin, feels better  PLAN  discussed antifibrotic, provided with literature.  She discussed with her son and so far has declined this  will monitor respiratory status  Discussed further with her son monitor O2 saturation  plan to follow CT chest  The order has been placed.  Pt is agreeable. Total time spent : 30 minutes Including: Preparation prior to visit - Reviewing prior record, results of tests and Consultation Reports as applicable Conducting an appropriate H & P during today's encounter Appropriate orders for tests, medications and procedures, as applicable Counseling patient  Note completion   Issac Vicente MD

## 2025-06-20 NOTE — PROCEDURE
[FreeTextEntry1] : PRO CROSS Date of Birth 1941-11-[address] CHEST w/o contrast Study Date & Time 2025-01-22 1:58 PM Patient ID [id number] Accession Number [id number] Referring Physician IKER BROWNE Institution Name [id number] RADIOLOGY REPORT FINDINGS FINDING ADDENDUM This addendum is dictated on 2/11/2025 in order to compare the 1/22/2025 examination with an additional remote previous outside chest CT from 1/20/2025 which has now been made available. The changes of moderate interstitial lung disease which are described on the current examination, with associated air trapping, have demonstrated mild diffuse interval worsening relative to the previous examination from 2021. Electronically signed by: Jose Angel Coleman MD 2/11/2025 7:17 PM Workstation: [id number] Addendum Electronically Signed By: Jose Angel Coleman MD Sign Date: 11-FEB-25 Reason for examination : Interstitial lung disease, follow-up Comparison: 4/20/2024 Technique: 3 mm spiral sections were obtained through the thorax both during inspiration and expiration.. This CT examination was performed using one or more of the following dose reduction techniques: Automated exposure control, adjustment of the mA and/or kV according to patient size, or use of a iterative reconstruction technique. Findings: HILUM AND MEDIASTINUM : No significant adenopathy or masses are present. THORACIC AORTA: No evidence of aneurysm. LUNG PARENCHYMA : There is a moderate degree of diffuse interstitial lung disease again identified, manifested by areas of reticulation, mosaic attenuation with air trapping, moderate traction bronchiectasis, and areas of groundglass opacity.. The findings have overall worsened mildly. In 4/29/25, 11:53 AM [employer] [email or web address] 1/[email or web address] addition, there is worsening superimposed consolidative opacity now identified in the left upper lobe, predominantly involving the anterior segment, which may represent superimposed infection versus worsening pneumonitis. There is no evidence of posterior subpleural sparing. There is no evidence of associated honeycombing. There are probable associated areas of small airways inflammatory disease also noted bilaterally. PLEURA : No evidence of pleural effusion or pleural-based mass. LOWER NECK/THYROID : Without focal nodules or masses. OSSEOUS STRUCTURES : Without focal mass. UPPER ABDOMEN : Small hiatal hernia. OTHER : Negative. IMPRESSION: Moderate degree of interstitial lung disease with associated air trapping, slightly worsened. These findings are most suggestive of chronic hypersensitivity pneumonitis. Additional less likely possibilities would include nonspecific interstitial pneumonia. Associated worsening predominantly consolidative airspace opacity in the left upper lobe which may be due to superimposed infection versus worsening left upper lobe pneumonitis. Electronically signed by: Jose Angel Coleman MD 1/22/2025 2:13 PM Workstation: [id number] Electronically Signed By: Jared URIOSTEGUI, Jose Angel BARAHONA Sign Date: 22-JAN-25 [facility] MD fco